# Patient Record
Sex: MALE | Race: WHITE
[De-identification: names, ages, dates, MRNs, and addresses within clinical notes are randomized per-mention and may not be internally consistent; named-entity substitution may affect disease eponyms.]

---

## 2020-01-14 ENCOUNTER — HOSPITAL ENCOUNTER (EMERGENCY)
Dept: HOSPITAL 4 - SED | Age: 80
Discharge: HOME | End: 2020-01-14
Payer: COMMERCIAL

## 2020-01-14 VITALS — BODY MASS INDEX: 41.12 KG/M2 | WEIGHT: 262 LBS | HEIGHT: 67 IN

## 2020-01-14 VITALS — SYSTOLIC BLOOD PRESSURE: 149 MMHG

## 2020-01-14 DIAGNOSIS — Z86.79: ICD-10-CM
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
## 2020-01-14 DIAGNOSIS — S06.2X9A: Primary | ICD-10-CM
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
## 2020-01-14 DIAGNOSIS — I10: ICD-10-CM

## 2020-01-14 NOTE — NUR
Patient given written and verbal discharge instructions and verbalizes 
understanding.  ER MD discussed with patient the results and treatment 
provided. Patient in stable condition. ID arm band removed. 

Rx of  given. Patient educated on pain management and to follow up with PMD. 
Pain Scale 0/10.

Opportunity for questions provided and answered. Medication side effect fact 
sheet provided.

## 2020-01-14 NOTE — NUR
Pt brought by family , A&Ox4, pt presents to ER post mechanical fall, skin pink 
and warm , VSS, respirations even and unlabored, cap refill <3, follows 
commands, ambulates with steady gait, no N/V noted.